# Patient Record
Sex: MALE | Race: WHITE
[De-identification: names, ages, dates, MRNs, and addresses within clinical notes are randomized per-mention and may not be internally consistent; named-entity substitution may affect disease eponyms.]

---

## 2022-02-23 ENCOUNTER — HOSPITAL ENCOUNTER (EMERGENCY)
Dept: HOSPITAL 97 - ER | Age: 67
Discharge: HOME | End: 2022-02-23
Payer: COMMERCIAL

## 2022-02-23 VITALS — SYSTOLIC BLOOD PRESSURE: 137 MMHG | OXYGEN SATURATION: 99 % | DIASTOLIC BLOOD PRESSURE: 69 MMHG

## 2022-02-23 VITALS — TEMPERATURE: 99.1 F

## 2022-02-23 DIAGNOSIS — M79.621: Primary | ICD-10-CM

## 2022-02-23 PROCEDURE — 99284 EMERGENCY DEPT VISIT MOD MDM: CPT

## 2022-02-23 PROCEDURE — 71045 X-RAY EXAM CHEST 1 VIEW: CPT

## 2022-02-23 NOTE — ER
Nurse's Notes                                                                                     

 [facility]                                                                 

Name: Lupillo Santana                                                                                 

Age: 66 yrs                                                                                       

Sex: Male                                                                                         

: 1955                                                                                   

MRN: [id number]                                                                                   

Arrival Date: 2022                                                                          

Time: 13:04                                                                                       

Account#: [id number]                                                                            

Bed Treatment                                                                                     

Private MD:                                                                                       

Diagnosis: Pain in right upper arm                                                                

                                                                                                  

Presentation:                                                                                     

                                                                                             

13:19 Chief complaint: Patient states: he has had right arm pain for several months now, he   ap3 

      presents to the ED today stating the pain now interrupts his sleep. Coronavirus screen:     

      At this time, the client does not indicate any symptoms associated with coronavirus-19.     

      Ebola Screen: No symptoms or risks identified at this time. Initial Sepsis Screen: Does     

      the patient meet any 2 criteria? No. Patient's initial sepsis screen is negative. Does      

      the patient have a suspected source of infection? No. Patient's initial sepsis screen       

      is negative. Risk Assessment: Do you want to hurt yourself or someone else? Patient         

      reports no desire to harm self or others. Onset of symptoms was .                       

13:19 Method Of Arrival: Ambulatory                                                           ap3 

13:19 Acuity: LAURI 4                                                                           ap3 

                                                                                                  

Triage Assessment:                                                                                

13:20 General: Appears in no apparent distress. Behavior is calm, cooperative. Pain:          ap3 

      Complains of pain in right arm Pain currently is 5 out of 10 on a pain scale. at worst      

      was 9 out of 10 on a pain scale. Neuro: Level of Consciousness is awake, alert, obeys       

      commands, Oriented to person, place, time, situation, Appropriate for age.                  

      Cardiovascular: Patient's skin is warm and dry. Respiratory: Airway is patent               

      Respiratory effort is even, unlabored, Respiratory pattern is regular, symmetrical.         

      Musculoskeletal: Reports pain in right arm.                                                 

                                                                                                  

Historical:                                                                                       

- Allergies:                                                                                      

13:20 No Known Allergies;                                                                     ap3 

- Home Meds:                                                                                      

13:20 None [Active];                                                                          ap3 

- PMHx:                                                                                           

13:20 None;                                                                                   ap3 

                                                                                                  

- Immunization history:: Client reports receiving the 2nd dose of the Covid vaccine.              

- Social history:: Smoking status: Patient denies any tobacco usage or history of.                

                                                                                                  

                                                                                                  

Screenin:21 Abuse screen: Denies threats or abuse. Nutritional screening: No deficits noted.        ap3 

      Tuberculosis screening: No symptoms or risk factors identified.                             

13:22 Fall Risk No fall in past 12 months (0 pts).                                            ap3 

                                                                                                  

Assessment:                                                                                       

13:52 General: Appears in no apparent distress. comfortable, Behavior is calm, cooperative,   ld1 

      appropriate for age. Pain: Complains of pain in anterior aspect of right shoulder,          

      right axilla and right bicep Pain does not radiate. Pain currently is 6 out of 10 on a      

      pain scale. at worst was 10 out of 10 on a pain scale. Quality of pain is described as      

      squeezing, throbbing, Pain began gradually, Is intermittent. Neuro: Level of                

      Consciousness is awake, alert, obeys commands, Oriented to person, place, time,             

      situation. Cardiovascular: Capillary refill < 3 seconds Patient's skin is warm and dry.     

      Respiratory: Airway is patent Respiratory effort is even, unlabored. GI: Abdomen is         

      flat, non-distended. : No signs and/or symptoms were reported regarding the               

      genitourinary system. EENT: No signs and/or symptoms were reported regarding the EENT       

      system. Derm: No signs and/or symptoms reported regarding the dermatologic system.          

      Musculoskeletal: Range of motion: intact in all extremities, Reports pain in anterior       

      aspect of right shoulder, right axilla and right bicep Pain is 6 out of 10 on a pain        

      scale.                                                                                      

15:08 Reassessment: Patient appears in no apparent distress at this time. Patient and/or      ld1 

      family updated on plan of care and expected duration. Pain level reassessed. Patient is     

      alert, oriented x 3, equal unlabored respirations, skin warm/dry/pink.                      

                                                                                                  

Vital Signs:                                                                                      

13:19  / 70; Pulse 64; Resp 17; Temp 99.1; Pulse Ox 100% ; Weight 70.76 kg; Height 5    ap3 

      ft. 7 in. (170.18 cm);                                                                      

13:52  / 72; Pulse 77; Resp 18; Pulse Ox 98% on R/A; Pain 6/10;                         ld1 

15:08  / 69; Pulse 71; Resp 18; Pulse Ox 99% on R/A; Pain 3/10;                         ld1 

13:19 Body Mass Index 24.43 (70.76 kg, 170.18 cm)                                             ap3 

                                                                                                  

ED Course:                                                                                        

13:04 Patient arrived in ED.                                                                  am2 

13:19 Kate Busch, RN is Primary Nurse.                                                  ap3 

13:20 Triage completed.                                                                       ap3 

13:21 Arm band placed on right wrist.                                                         ap3 

13:50 Kimberly Morales FNP-C is [facility].                                                        kb  

13:50 Vanesa Smith MD is Attending Physician.                                           kb  

13:52 Patient has correct armband on for positive identification. Placed in gown. Bed in low  ld1 

      position. Call light in reach. Side rails up X2. Pulse ox on. NIBP on. Door closed.         

      Noise minimized.                                                                            

13:52 No provider procedures requiring assistance completed.                                  ld1 

15:20 Chest Single View XRAY In Process Unspecified.                                          EDMS

15:20 Humerus Right XRAY In Process Unspecified.                                              EDMS

16:04 Patient did not have IV access during this emergency room visit.                        ld1 

                                                                                                  

Administered Medications:                                                                         

14:49 Drug: Flexeril (cyclobenzaprine) 10 mg Route: PO;                                       ld1 

                                                                                                  

                                                                                                  

Outcome:                                                                                          

15:54 Discharge ordered by MD.                                                                kb  

16:03 Discharged to home ambulatory.                                                          ld1 

16:03 Condition: stable                                                                           

16:03 Discharge instructions given to patient, Instructed on discharge instructions, follow       

      up and referral plans. medication usage, Demonstrated understanding of instructions,        

      follow-up care, medications, Prescriptions given X 2.                                       

16:04 Patient left the ED.                                                                    ld1 

                                                                                                  

Signatures:                                                                                       

Dispatcher [employer]                           EDMS                                                 

Kimberly Morales FNP-C FNP-Kate Berry                               am2                                                  

Kate Busch, RN                    RN   ap3                                                  

Mayelin Agudelo, RN                     RN   ld1                                                  

                                                                                                  

**************************************************************************************************

## 2022-02-23 NOTE — RAD REPORT
EXAM DESCRIPTION:  RAD - Humerus Right - 2/23/2022 3:20 pm

 

CLINICAL HISTORY:  PAIN

 

COMPARISON:  No comparisons

 

FINDINGS:  Mild AC joint degenerative changes are present. No acute fracture or dislocation seen.

## 2022-02-23 NOTE — RAD REPORT
EXAM DESCRIPTION:  RAD - Chest Single View - 2/23/2022 3:20 pm

 

CLINICAL HISTORY:  CHEST PAIN

Chest pain.

 

COMPARISON:  No comparisons

 

FINDINGS:  Portable technique limits examination quality.

 

The lungs are grossly clear. The heart is normal in size. No displaced fractures.

 

IMPRESSION:  No acute intrathoracic process suspected.

## 2022-02-23 NOTE — EDPHYS
Physician Documentation                                                                           

 [facility]                                                                 

Name: Lupillo Santana                                                                                 

Age: 66 yrs                                                                                       

Sex: Male                                                                                         

: 1955                                                                                   

MRN: [id number]                                                                                   

Arrival Date: 2022                                                                          

Time: 13:04                                                                                       

Account#: [id number]                                                                            

Bed Treatment                                                                                     

Private MD:                                                                                       

ED Physician Vanesa Smith                                                                    

HPI:                                                                                              

                                                                                             

15:15 This 66 yrs old Male presents to ER via Ambulatory with complaints of Arm Pain.         kb  

15:15 The patient or guardian complains of pain, that is acute. The complaints affect the     kb  

      right upper arm and right clavicle. Context: The problem was sustained at home,             

      resulted from unknown cause. Onset: The symptoms/episode began/occurred months ago.         

      Treatment prior to arrival includes: no previous treatment. Modifying factors: The          

      symptoms are alleviated by movement, activity. the symptoms are aggravated by remaining     

      still. Associated signs and symptoms: Pertinent positives: pain, Pertinent negatives:       

      decreased range of motion, deformity, erythema, fever, nausea, numbness, swelling,          

      tingling, vomiting, warmth, weakness. Severity of symptoms: At their worst the symptoms     

      were mild, moderate, in the emergency department the symptoms are unchanged. The            

      patient has not experienced similar symptoms in the past. The patient has not recently      

      seen a physician.                                                                           

                                                                                                  

Historical:                                                                                       

- Allergies:                                                                                      

13:20 No Known Allergies;                                                                     ap3 

- Home Meds:                                                                                      

13:20 None [Active];                                                                          ap3 

- PMHx:                                                                                           

13:20 None;                                                                                   ap3 

                                                                                                  

- Immunization history:: Client reports receiving the 2nd dose of the Covid vaccine.              

- Social history:: Smoking status: Patient denies any tobacco usage or history of.                

                                                                                                  

                                                                                                  

ROS:                                                                                              

15:14 Constitutional: Negative for fever, chills, and weight loss.                            kb  

15:14 MS/extremity: Positive for pain, of the right clavicle and right upper arm.                 

15:14 All other systems are negative.                                                             

                                                                                                  

Exam:                                                                                             

15:14 Constitutional:  This is a well developed, well nourished patient who is awake, alert,  kb  

      and in no acute distress. Head/Face:  Normocephalic, atraumatic. ENT:  Moist Mucous         

      membranes Chest/axilla:  Normal chest wall appearance and motion.  Cardiovascular:          

      Regular rate and rhythm with a normal S1 and S2.  No gallops, murmurs, or rubs.  No         

      pulse deficits. Respiratory:  Respirations even and unlabored. No increased work of         

      breathing. Talking in full sentences Skin:  Warm, dry with normal turgor.  Normal           

      color. MS/ Extremity:  Pulses equal, no cyanosis.  Neurovascular intact.  Full, normal      

      range of motion. Neuro:  Awake and alert, GCS 15, oriented to person, place, time, and      

      situation. Moves all extremities. Normal gait. Psych:  Awake, alert, with orientation       

      to person, place and time.  Behavior, mood, and affect are within normal limits.            

                                                                                                  

Vital Signs:                                                                                      

13:19  / 70; Pulse 64; Resp 17; Temp 99.1; Pulse Ox 100% ; Weight 70.76 kg; Height 5    ap3 

      ft. 7 in. (170.18 cm);                                                                      

13:52  / 72; Pulse 77; Resp 18; Pulse Ox 98% on R/A; Pain 6/10;                         ld1 

15:08  / 69; Pulse 71; Resp 18; Pulse Ox 99% on R/A; Pain 3/10;                         ld1 

13:19 Body Mass Index 24.43 (70.76 kg, 170.18 cm)                                             ap3 

                                                                                                  

MDM:                                                                                              

13:51 Patient medically screened.                                                             kb  

15:15 Data reviewed: vital signs, nurses notes. Data interpreted: Pulse oximetry: on room air kb  

      is 99 %. Interpretation: normal. Counseling: I had a detailed discussion with the           

      patient and/or guardian regarding: the historical points, exam findings, and any            

      diagnostic results supporting the discharge/admit diagnosis, radiology results, the         

      need for outpatient follow up, a family practitioner, to return to the emergency            

      department if symptoms worsen or persist or if there are any questions or concerns that     

      arise at home.                                                                              

                                                                                                  

                                                                                             

14:41 Order name: Chest Single View XRAY; Complete Time: 15:48                                kb  

                                                                                             

14:41 Order name: Humerus Right XRAY; Complete Time: 15:48                                    kb  

                                                                                                  

Administered Medications:                                                                         

14:49 Drug: Flexeril (cyclobenzaprine) 10 mg Route: PO;                                       ld1 

                                                                                                  

                                                                                                  

Disposition Summary:                                                                              

22 15:54                                                                                    

Discharge Ordered                                                                                 

      Location: Home                                                                          kb  

      Condition: Stable                                                                       kb  

      Diagnosis                                                                                   

        - Pain in right upper arm                                                             kb  

      Followup:                                                                               kb  

        - With: Emergency Department                                                               

        - When: As needed                                                                          

        - Reason: Worsening of condition                                                           

      Followup:                                                                               kb  

        - With: Private Physician                                                                  

        - When: 2 - 3 days                                                                         

        - Reason: Recheck today's complaints, Continuance of care, Re-evaluation by your           

      physician                                                                                   

      Discharge Instructions:                                                                     

        - Discharge Summary Sheet                                                             kb  

        - Musculoskeletal Pain                                                                kb  

      Forms:                                                                                      

        - Medication Reconciliation Form                                                      kb  

        - Thank You Letter                                                                    kb  

        - Antibiotic Education                                                                kb  

        - Prescription Opioid Use                                                             kb  

      Prescriptions:                                                                              

        - Prednisone 20 mg Oral Tablet                                                             

            - take 1 tablet by ORAL route once daily for 5 days; 5 tablet; Refills: 0,        kb  

      Product Selection Permitted                                                                 

        - Cyclobenzaprine 10 mg Oral Tablet                                                        

            - take 1 tablet by ORAL route every 8 hours As needed; 21 tablet; Refills: 0,     kb  

      Product Selection Permitted                                                                 

Signatures:                                                                                       

Dispatcher [employer]                           Kimberly Roque FNP-C FNP-Ckb Prokisch, Amanda RN                    RN   ap3                                                  

Mayelin Agudelo RN                     RN   ld1                                                  

                                                                                                  

**************************************************************************************************

## 2022-06-07 LAB
BUN BLD-MCNC: 18 MG/DL (ref 7–18)
GLUCOSE SERPLBLD-MCNC: 98 MG/DL (ref 74–106)
HCT VFR BLD CALC: 42.3 % (ref 39.6–49)
INR BLD: 0.95
LYMPHOCYTES # SPEC AUTO: 1.9 K/UL (ref 0.7–4.9)
PMV BLD: 7.2 FL (ref 7.6–11.3)
POTASSIUM SERPL-SCNC: 3.9 MMOL/L (ref 3.5–5.1)
RBC # BLD: 4.63 M/UL (ref 4.33–5.43)

## 2022-06-07 NOTE — RAD REPORT
EXAM DESCRIPTION:  RAD - Chest Pa And Lat (2 Views) - 6/7/2022 11:34 am

 

CLINICAL HISTORY:  Pre op pending rotator cuff repair

 

COMPARISON:  AP chest 02/23/2022

 

TECHNIQUE:  Frontal and lateral views of the chest were obtained.

 

FINDINGS:  The lungs are clear.   Heart size is normal and central vasculature is within normal limit
s.  No pleural effusion or pneumothorax seen.  No acute bony finding noted.  No aortic abnormality.  
No significant change from comparison study.

 

IMPRESSION:  No acute cardiopulmonary process.

## 2022-06-10 ENCOUNTER — HOSPITAL ENCOUNTER (OUTPATIENT)
Dept: HOSPITAL 97 - OR | Age: 67
Discharge: HOME | End: 2022-06-10
Attending: ORTHOPAEDIC SURGERY
Payer: COMMERCIAL

## 2022-06-10 VITALS — OXYGEN SATURATION: 100 %

## 2022-06-10 VITALS — SYSTOLIC BLOOD PRESSURE: 120 MMHG | TEMPERATURE: 96.3 F | DIASTOLIC BLOOD PRESSURE: 69 MMHG

## 2022-06-10 DIAGNOSIS — M75.101: ICD-10-CM

## 2022-06-10 DIAGNOSIS — S46.811A: Primary | ICD-10-CM

## 2022-06-10 DIAGNOSIS — Z20.822: ICD-10-CM

## 2022-06-10 DIAGNOSIS — M75.21: ICD-10-CM

## 2022-06-10 DIAGNOSIS — M75.41: ICD-10-CM

## 2022-06-10 PROCEDURE — 85610 PROTHROMBIN TIME: CPT

## 2022-06-10 PROCEDURE — 36415 COLL VENOUS BLD VENIPUNCTURE: CPT

## 2022-06-10 PROCEDURE — 29826 SHO ARTHRS SRG DECOMPRESSION: CPT

## 2022-06-10 PROCEDURE — 0RNJ4ZZ RELEASE RIGHT SHOULDER JOINT, PERCUTANEOUS ENDOSCOPIC APPROACH: ICD-10-PCS

## 2022-06-10 PROCEDURE — 0RBJ4ZZ EXCISION OF RIGHT SHOULDER JOINT, PERCUTANEOUS ENDOSCOPIC APPROACH: ICD-10-PCS

## 2022-06-10 PROCEDURE — 85025 COMPLETE CBC W/AUTO DIFF WBC: CPT

## 2022-06-10 PROCEDURE — 80048 BASIC METABOLIC PNL TOTAL CA: CPT

## 2022-06-10 PROCEDURE — 73020 X-RAY EXAM OF SHOULDER: CPT

## 2022-06-10 PROCEDURE — 29828 SHO ARTHRS SRG BICP TENODSIS: CPT

## 2022-06-10 PROCEDURE — 85730 THROMBOPLASTIN TIME PARTIAL: CPT

## 2022-06-10 PROCEDURE — 71046 X-RAY EXAM CHEST 2 VIEWS: CPT

## 2022-06-10 PROCEDURE — 0LS34ZZ REPOSITION RIGHT UPPER ARM TENDON, PERCUTANEOUS ENDOSCOPIC APPROACH: ICD-10-PCS

## 2022-06-10 PROCEDURE — 0RHJ44Z INSERTION OF INTERNAL FIXATION DEVICE INTO RIGHT SHOULDER JOINT, PERCUTANEOUS ENDOSCOPIC APPROACH: ICD-10-PCS

## 2022-06-10 PROCEDURE — 93005 ELECTROCARDIOGRAM TRACING: CPT

## 2022-06-10 PROCEDURE — 29822 SHO ARTHRS SRG LMTD DBRDMT: CPT

## 2022-06-10 PROCEDURE — 0LQ14ZZ REPAIR RIGHT SHOULDER TENDON, PERCUTANEOUS ENDOSCOPIC APPROACH: ICD-10-PCS

## 2022-06-10 PROCEDURE — 29827 SHO ARTHRS SRG RT8TR CUF RPR: CPT

## 2022-06-10 NOTE — P.BOP
Preoperative diagnosis: right shoulder rotator cuff tear, biceps tendinitis, 

impingement syndrome


Postoperative diagnosis: same, right SLAP tear


Primary procedure: right shoulder arthroscopic rotator cuff repair


Secondary procedure: right shoulder SLAP debridement, right subacromial dec

ompression


Other procedure(s): right shoulder open biceps tenodesis


Estimated blood loss: 10 cc


Specimen: none


Findings: see dictation


Anesthesia: General


Complications: None


Implants: 7 mm Arthrex biotenodesis screw, 2- 4.75 mm Arthrex swivelock


Fluids & blood products: per anesthesia record


Transferred to: Recovery Room


Condition: Good

## 2022-06-10 NOTE — RAD REPORT
EXAM DESCRIPTION:  RAD - Shoulder 1 View - 6/10/2022 11:11 am

 

CLINICAL HISTORY:  Right shoulder surgery

 

FINDINGS:  Frontal view of the right shoulder was obtained.

 

No fracture or dislocation is seen.

 

Mild joint space narrowing involves the acromioclavicular joint.

## 2022-06-11 NOTE — OP
Date of Procedure:  06/10/2022



Surgeon:  Alec Painting MD



Preoperative Diagnoses:  

1.Right shoulder rotator cuff tear.

2.Right shoulder subscapularis tear.

3.Right shoulder bicipital tenosynovitis.

4.Right shoulder impingement syndrome.



Postoperative Diagnoses:  

1.Right shoulder rotator cuff tear.

2.Right shoulder subscapularis tear.

3.Right shoulder bicipital tenosynovitis.

4.Right shoulder impingement syndrome. 

5.Right shoulder SLAP tear.



Procedures Performed:  

1.Right shoulder arthroscopic rotator cuff repair with subscapularis repair.

2.Right shoulder arthroscopic SLAP tear debridement.

3.Right shoulder arthroscopic subacromial decompression.  

4.Right shoulder open subpectoral biceps tenodesis.



Anesthesia:  General endotracheal.



Fluids:  Per Anesthesia record.



Ebl:  10 cc.



Complications:  None.



Implants:  

1.Two 4.75 mm SwiveLocks.

2.7 x 19 mm Bio-Tenodesis screw.



Indication For Procedure:  Lupillo is a 66-year-old male presented to my clinic with signs and symptoms a
s well as MRI findings consistent with the right shoulder supraspinatus and subscapularis tears with 
medial subluxation of biceps tendon and impingement syndrome.  I discussed with the patient at length
 risks and benefits associated with operative and nonoperative treatment.  He expressed understanding
 and wished to proceed with operative treatment.



Description Of Procedure:  After informed consent was obtained, the patient was identified in the pre
operative holding area.  The right upper extremity was marked.  He was brought back to the PACU where
 he underwent an interscalene block to his right upper extremity performed by Anesthesia.  He was the
n brought back to the operating room, transferred to the operating table in the supine fashion and pl
aced under general endotracheal anesthesia.  He was placed in the beach chair position with his extre
mities well padded.  The right upper extremity was then prepped and draped in usual sterile fashion. 
 A time-out was initiated.  The correct patient and procedure confirmed and identified.  The patient 
did receive his preoperative prophylactic antibiotics.  A spinal needle was introduced in the glenohu
meral joint via the posterior portal position.  The shoulder was injected with 30 cc of normal saline
 to distend the capsule.  A stab incision was made posteriorly to create a posterior portal.  Arthros
cope was brought via the posterior portal position and diagnostic arthroscopy was performed.  Once br
ought into the glenohumeral joint, the patient was noted to have significant fraying of the superior 
labrum as well as superior border of the subscapularis and biceps tendon anchor.  The biceps tendon w
as noted to be subluxated and at that point, an anterior superior lateral portal was created and a ca
nnula was placed.  A biceps tenotomy was performed given the subluxated biceps tendon.  The superior 
labral tear, which was consistent with type 1 SLAP tear was then debrided using the arthroscopic shav
er.  The anterior and posterior labrum were found to be stable to probe.  The second anterior lower p
ortal was created to aid with repair of the subscapularis.  A FiberTape suture was then passed throug
h the superior border of the subscapularis tendon to repair.  Once this was passed and was found to b
e reducible, a punch was placed over the lesser tuberosity after it had been debrided to create a ble
eding bony bed to aid with repair and healing.  A 4.75 mm Arthrex SwiveLock was then used for repair 
of the subscapularis tear to the lesser tuberosity.  Remaining sutures were then cut.  Arthroscope wa
s brought on the axillary pouch.  There were no loose bodies found within the pouch.  There was noted
 to be a full-thickness tear of the anterior border of the supraspinatus, lateral portal was created.
  The arthroscopic shaver was then used to debride the undersurface of the rotator cuff supraspinatus
 tear as well as greater tuberosity to create a bleeding bony bed.  It was then marked with a PDS sut
ure.  The arthroscope was then brought up the subacromial space and subacromial bursectomy was perfor
med.  The PDS suture was identified and removed.  The rotator cuff to the supraspinatus tear was then
 debrided using arthroscopic shaver as well as greater tuberosity.  It was a small tear and a speed f
ix fixation was then used.  A SutureTape was then passed through the supraspinatus tear using an inve
rted horizontal mattress fashion.  The greater tuberosity was then punched and rotator cuff tear was 
repaired with 4.75 mm Arthrex SwiveLock.  There was good overall reduction of the tendon onto the gre
ater tuberosity.  The patient was noted to have some fraying of the undersurface of the coracoacromia
l ligament and subacromial decompression was performed at that time.  Using a radiofrequency ablator 
to debride off the undersurface of the acromion as well as the arthroscopic bur to perform an acromio
plasty.  The arthroscopic instruments were then removed without complication.  Next, attention was ta
ciro to the subpectoral open biceps tenodesis.  Approximately 4 cm longitudinal incision was made just
 medial to the pec tendon insertion was made.  Dissection was then taken down to the fascia, which wa
s split in line with incision.  The pec tendon was then gently retracted superior and laterally.  The
 long head of biceps tendon was identified and was brought out through the incision using a right ang
le clamp and Allis and was marked approximately 3 cm distal to the myotendinous junction.  It was the
n whipstitched using a FiberLoop and the remaining diseased tendon was debrided and removed.  It fit 
well within a 7 mm soft and a bicipital groove was identified and debrided.  A guide pin was then matt
case in the unicortical fashion followed by an 8 mm reamer in unicortical fashion.  A 7 mm Bio-Tenodes
is screw was then placed and one of the suture limbs was pulled through the anchor.  The tendon was p
laced within the bicipital groove and there was good overall fixation of the biceps tendon with the a
nchor.  The suture limbs were then tied over the anchor and cut.  The wound was then irrigated thorou
ghly with normal saline.  Subcutaneous tissue was approximated using a 2-0 Vicryl.  Skin was approxim
ated using 4-0 Monocryl.  Sterile dressings were applied.  The patient was placed in a shoulder immob
ilizer, awakened, and transferred to PACU in stable condition.



Postoperative Plan:  The patient will be in a shoulder immobilizer for 6 weeks.  He will begin physic
al therapy at approximately 4 weeks 

postop per medium rotator cuff repair protocol.  He will follow up in 1 week for wound check.





CV/MODL

DD:  06/11/2022 07:44:20Voice ID:  [id number]

DT:  06/11/2022 12:08:44Report ID:  [id number]